# Patient Record
Sex: MALE | Race: WHITE | NOT HISPANIC OR LATINO | Employment: FULL TIME | URBAN - METROPOLITAN AREA
[De-identification: names, ages, dates, MRNs, and addresses within clinical notes are randomized per-mention and may not be internally consistent; named-entity substitution may affect disease eponyms.]

---

## 2018-09-21 ENCOUNTER — OFFICE VISIT (OUTPATIENT)
Dept: FAMILY MEDICINE | Facility: CLINIC | Age: 33
End: 2018-09-21
Payer: COMMERCIAL

## 2018-09-21 ENCOUNTER — HOSPITAL ENCOUNTER (OUTPATIENT)
Dept: RADIOLOGY | Facility: HOSPITAL | Age: 33
Discharge: HOME | End: 2018-09-21
Attending: FAMILY MEDICINE
Payer: COMMERCIAL

## 2018-09-21 ENCOUNTER — TELEPHONE (OUTPATIENT)
Dept: FAMILY MEDICINE | Facility: CLINIC | Age: 33
End: 2018-09-21

## 2018-09-21 VITALS
SYSTOLIC BLOOD PRESSURE: 136 MMHG | DIASTOLIC BLOOD PRESSURE: 70 MMHG | OXYGEN SATURATION: 97 % | HEART RATE: 97 BPM | HEIGHT: 72 IN | TEMPERATURE: 98 F | BODY MASS INDEX: 30.46 KG/M2 | WEIGHT: 224.88 LBS

## 2018-09-21 DIAGNOSIS — M25.561 PAIN OF RIGHT KNEE AFTER INJURY: Primary | ICD-10-CM

## 2018-09-21 DIAGNOSIS — M25.561 PAIN OF RIGHT KNEE AFTER INJURY: ICD-10-CM

## 2018-09-21 PROCEDURE — 99999 PR PBB SHADOW E&M-NEW PATIENT-LVL IV: CPT | Mod: PBBFAC,,, | Performed by: FAMILY MEDICINE

## 2018-09-21 PROCEDURE — 99214 OFFICE O/P EST MOD 30 MIN: CPT | Mod: S$GLB,,, | Performed by: FAMILY MEDICINE

## 2018-09-21 PROCEDURE — 73562 X-RAY EXAM OF KNEE 3: CPT | Mod: TC,FY,PO,RT

## 2018-09-21 PROCEDURE — 3008F BODY MASS INDEX DOCD: CPT | Mod: CPTII,S$GLB,, | Performed by: FAMILY MEDICINE

## 2018-09-21 PROCEDURE — 73562 X-RAY EXAM OF KNEE 3: CPT | Mod: 26,RT,, | Performed by: RADIOLOGY

## 2018-09-21 RX ORDER — IBUPROFEN 800 MG/1
800 TABLET ORAL 3 TIMES DAILY
Qty: 60 TABLET | Refills: 1 | Status: SHIPPED | OUTPATIENT
Start: 2018-09-21

## 2018-09-21 NOTE — PATIENT INSTRUCTIONS
Knee Pain of Uncertain Cause    There are several common causes for knee pain. These can include:  · A sprain of the ligaments that support the joint  · An injury to the cartilage lining of the joint  · Arthritis from wear-and-tear or inflammation  There are other causes as well. There may also be swelling, reduced movement of the knee joint, and pain with walking. A definite diagnosis will still need to be made. If your symptoms do not improve, further follow-up and testing may be needed.  Home care  · Stay off the injured leg as much as possible until pain improves.  · Apply an ice pack over the injured area for 15 to 20 minutes every 3 to 6 hours. You should do this for the first 24 to 48 hours. You can make an ice pack by filling a plastic bag that seals at the top with ice cubes and then wrapping it with a thin towel. Continue to use ice packs for relief of pain and swelling as needed. As the ice melts, be careful to avoid getting your wrap, splint, or cast wet. After 48 hours, apply heat (warm shower or warm bath) for 15 to 20 minutes several times a day, or alternate ice and heat. If you have to wear a hook-and-loop knee brace, you can open it to apply the ice pack, or heat, directly to the knee. Never put ice directly on the skin. Always wrap the ice in a towel or other type of cloth.  · You may use over-the-counter pain medicine to control pain, unless another pain medicine was prescribed. If you have chronic liver or kidney disease or ever had a stomach ulcer or GI bleeding, talk with your healthcare provider using these medicines.  · If crutches or a walker have been recommended, do not put weight on the injured leg until you can do so without pain. Check with your healthcare provider before returning to sports or full work duties.  · If you have a hook-and-loop knee brace, you can remove it to bathe and sleep, unless told otherwise.  Follow-up care  Follow up with your healthcare provider as advised.  This is usually within 1-2 weeks.  If X-rays were taken, you will be told of any new findings that may affect your care.  Call 911  Call 911 if you have:  · Shortness of breath  · Chest pain  When to seek medical advice  Call your healthcare provider right away if any of these occur:  · Toes or foot becomes swollen, cold, blue, numb, or tingly  · Pain or swelling spreads over the knee or calf  · Warmth or redness appears over the knee or calf  · Other joints become painful  · Rash appears  · Fever of 100.4°F (38°C) or above lasting for 24 to 48 hours  Date Last Reviewed: 11/23/2015  © 4195-2883 [employer] [address] [place] [address], [place] [address]. All rights reserved. This information is not intended as a substitute for professional medical care. Always follow your healthcare professional's instructions.

## 2018-09-23 NOTE — PROGRESS NOTES
Routine Office Visit    Patient Name: Brendan Arrington    : 1985  MRN: [id number]    Subjective:  Brendan is a 33 y.o. male who presents today for     1. Right knee pain - started yesterday evening - patient states he was playing basketball and planted his leg and felt a pull when he tried to move. He allowed himself to fall because he did not want to further injure his knee. . He felt and heard a stretching sensation in his outer knee.  He was able to bear weight yesterday and walk to his car and then home. He has been icing and elevating his knee. He feels his knee pain is worse than the injury he had 3 years ago. He would like an evaluation by sports medicine. He states he has been working out regularly and has been doing strength training.      Review of Systems   Constitutional: Negative for chills and fever.   HENT: Negative for congestion.    Eyes: Negative for blurred vision.   Respiratory: Negative for cough.    Cardiovascular: Negative for chest pain.   Gastrointestinal: Negative for abdominal pain, constipation, diarrhea, heartburn, nausea and vomiting.   Genitourinary: Negative for dysuria.   Musculoskeletal: Positive for joint pain. Negative for myalgias.   Skin: Negative for itching and rash.   Neurological: Negative for dizziness and headaches.   Psychiatric/Behavioral: Negative for depression.       Active Problem List  There is no problem list on file for this patient.      Past Surgical History  Past Surgical History:   Procedure Laterality Date    NO PAST SURGERIES         Family History  History reviewed. No pertinent family history.    Social History  Social History     Socioeconomic History    Marital status: Single     Spouse name: Not on file    Number of children: Not on file    Years of education: Not on file    Highest education level: Not on file   Social Needs    Financial resource strain: Not on file    Food insecurity - worry: Not on file    Food insecurity - inability:  Not on file    Transportation needs - medical: Not on file    Transportation needs - non-medical: Not on file   Occupational History    Not on file   Tobacco Use    Smoking status: Never Smoker   Substance and Sexual Activity    Alcohol use: Yes     Alcohol/week: 0.0 oz     Comment: socially    Drug use: Not on file    Sexual activity: Not on file   Other Topics Concern    Not on file   Social History Narrative    Not on file       Medications and Allergies  Reviewed and updated.   Current Outpatient Medications   Medication Sig    ibuprofen (ADVIL,MOTRIN) 800 MG tablet Take 1 tablet (800 mg total) by mouth 3 (three) times daily.     No current facility-administered medications for this visit.        Physical Exam  /70 (BP Location: Right arm, Patient Position: Sitting, BP Method: Large (Manual))   Pulse 97   Temp 98 °F (36.7 °C) (Oral)   Ht 6' (1.829 m)   Wt 102 kg (224 lb 13.9 oz)   SpO2 97%   BMI 30.50 kg/m²   Physical Exam   Constitutional: He is oriented to person, place, and time. He appears well-developed and well-nourished.   HENT:   Head: Normocephalic and atraumatic.   Eyes: Conjunctivae and EOM are normal. Pupils are equal, round, and reactive to light.   Neck: Normal range of motion. Neck supple. No JVD present. No thyromegaly present.   Cardiovascular: Normal rate, regular rhythm and normal heart sounds.   Pulmonary/Chest: Effort normal and breath sounds normal. He has no wheezes.   Abdominal: Soft. Bowel sounds are normal. He exhibits no distension. There is no tenderness. There is no guarding.   Musculoskeletal:        Right knee: He exhibits decreased range of motion and swelling. He exhibits no ecchymosis, no deformity, no laceration, normal alignment, no bony tenderness, normal meniscus and no MCL laxity. Tenderness found. Lateral joint line tenderness noted.   Lymphadenopathy:     He has no cervical adenopathy.   Neurological: He is alert and oriented to person, place, and  time.   Skin: Skin is warm and dry.   Psychiatric: He has a normal mood and affect. His behavior is normal.         Assessment/Plan:  Brendan Arrington is a 33 y.o. male who presents today for :    Problem List Items Addressed This Visit     None      Visit Diagnoses     Pain of right knee after injury    -  Primary    Relevant Medications    ibuprofen (ADVIL,MOTRIN) 800 MG tablet    Other Relevant Orders    X-Ray Knee 3 View Right (Completed)    Ambulatory Referral to Sports Medicine  Continue conservative treatment  RICE   Recommend stretching exercises               Follow-up if symptoms worsen or fail to improve.

## 2018-09-24 ENCOUNTER — PATIENT MESSAGE (OUTPATIENT)
Dept: FAMILY MEDICINE | Facility: CLINIC | Age: 33
End: 2018-09-24

## 2018-09-26 ENCOUNTER — TELEPHONE (OUTPATIENT)
Dept: FAMILY MEDICINE | Facility: CLINIC | Age: 33
End: 2018-09-26

## 2018-09-28 ENCOUNTER — OFFICE VISIT (OUTPATIENT)
Dept: SPORTS MEDICINE | Facility: CLINIC | Age: 33
End: 2018-09-28
Payer: COMMERCIAL

## 2018-09-28 VITALS
HEIGHT: 72 IN | BODY MASS INDEX: 30.34 KG/M2 | HEART RATE: 99 BPM | WEIGHT: 224 LBS | SYSTOLIC BLOOD PRESSURE: 144 MMHG | DIASTOLIC BLOOD PRESSURE: 83 MMHG

## 2018-09-28 DIAGNOSIS — M25.561 RIGHT KNEE PAIN, UNSPECIFIED CHRONICITY: Primary | ICD-10-CM

## 2018-09-28 DIAGNOSIS — M25.361 KNEE INSTABILITY, RIGHT: ICD-10-CM

## 2018-09-28 DIAGNOSIS — M25.461 KNEE EFFUSION, RIGHT: ICD-10-CM

## 2018-09-28 PROCEDURE — 99999 PR PBB SHADOW E&M-EST. PATIENT-LVL III: CPT | Mod: PBBFAC,,, | Performed by: ORTHOPAEDIC SURGERY

## 2018-09-28 PROCEDURE — 3008F BODY MASS INDEX DOCD: CPT | Mod: CPTII,S$GLB,, | Performed by: ORTHOPAEDIC SURGERY

## 2018-09-28 PROCEDURE — 99204 OFFICE O/P NEW MOD 45 MIN: CPT | Mod: S$GLB,,, | Performed by: ORTHOPAEDIC SURGERY

## 2018-09-28 NOTE — PROGRESS NOTES
"CC: Right knee pain    33 y.o. Male presents today for evaluation of his right knee pain. Patient is a  at [employer] on the [employer]. He does a lot of walking as part of his job duties. Patient took off 9/21 and 9/24 from work due to pain. Patient has PMH (3 yrs ago) of R knee injury where he did a Euro step and when leaping toward the hoop his knee "gave out on him". He did not seek any further treatment as he did some rehabing on his own and it got better.     On 9/20/18, he planted his foot and went to cross over while playing basketball. He felt the pain and tearing sensation when pushing off of that foot. He could not put any weight on his leg immediately after the injury. After this injury, he went to his PCP who advised ibuprofen 800mg and icing, which he has been doing and he states have helped the swelling and his pain. He describes the injury as  "buckling and tearing sensation in knee while cutting". Pain is present on all different aspects of the knee. Patient could not pin point one spot. Patient reports the pain has been getting better daily. He has been resting his right leg consistently since this most recent injury. He admits to lots of walking or standing and squatting make his pain worse. Overall, he rates his pain as a 4/10 on the pain scale. He admits to mechanical symptoms of clicking when going from seated to standing and feeling like it's going to buckle on him at times. Patient reports the pain does not stop him from his ADLs, but he does notice the pain.    REVIEW OF SYSTEMS:   Constitution: Patient denies fever, chills, night sweats, and weight changes.  Eyes: Patient denies eye pain or vision changes.  HENT: Patient denies headache, ear pain, sore throat, or nasal discharge.  CVS: Patient denies chest pain.  Lungs: Patient denies shortness of breath or cough.  Abd: Patient denies stomach pain, nausea, or vomiting.  Skin: Patient denies skin rash or itching.  "   Hematologic/Lymphatic: Patient denies easy bruising.   Musculoskeletal: Patient denies recent falls. See HPI.  Psych: Patient denies any current anxiety or nervousness.    PAST MEDICAL HISTORY:   History reviewed. No pertinent past medical history.    PAST SURGICAL HISTORY:   Past Surgical History:   Procedure Laterality Date    NO PAST SURGERIES         FAMILY HISTORY:   History reviewed. No pertinent family history.    SOCIAL HISTORY:   Social History     Socioeconomic History    Marital status: Single     Spouse name: Not on file    Number of children: Not on file    Years of education: Not on file    Highest education level: Not on file   Social Needs    Financial resource strain: Not on file    Food insecurity - worry: Not on file    Food insecurity - inability: Not on file    Transportation needs - medical: Not on file    Transportation needs - non-medical: Not on file   Occupational History    Not on file   Tobacco Use    Smoking status: Never Smoker   Substance and Sexual Activity    Alcohol use: Yes     Alcohol/week: 0.0 oz     Comment: socially    Drug use: Not on file    Sexual activity: Not on file   Other Topics Concern    Not on file   Social History Narrative    Not on file       MEDICATIONS:     Current Outpatient Medications:     ibuprofen (ADVIL,MOTRIN) 800 MG tablet, Take 1 tablet (800 mg total) by mouth 3 (three) times daily., Disp: 60 tablet, Rfl: 1    ALLERGIES:   Review of patient's allergies indicates:  No Known Allergies     PHYSICAL EXAMINATION:  BP (!) 144/83   Pulse 99   Ht 6' (1.829 m)   Wt 101.6 kg (224 lb)   BMI 30.38 kg/m²   Vitals signs and nursing note have been reviewed.  General: In no acute distress, well developed, well nourished, no diaphoresis  Eyes: EOM full and smooth, no eye redness or discharge  HENT: normocephalic and atraumatic, neck supple, trachea midline, no nasal discharge, no external ear redness or discharge  Cardiovascular: no LE  edema  Lungs: respirations non-labored, no conversational dyspnea   Abd: non-distended, no rigidity  MSK: no amputation or deformity, no swelling of extremities  Neuro: AAOx3, CN2-12 grossly intact  Skin: No rashes, warm and dry  Psychiatric: cooperative, pleasant, mood and affect appropriate for age    MUSCULOSKELETAL EXAM:    RIGHT KNEE EXAMINATION   Affected side is compared to contralateral knee     Observation:  No edema, erythema, ecchymosis, noted. Mild effusion of medial joint.  No muscle atrophy of the thighs and calves noted.  No obvious bony deformities noted.     Tenderness:  Patella - none    Lateral joint line - none  Quad tendon - none   Medial joint line - mild  Patellar tendon - none   Medial plica - none  Tibial tubercle - none   Lateral plica - none  Pes anserine - none   MCL prox - none  Distal ITB - none   MCL distal - none  MFC - none    LCL prox - none  LFC - none    LCL distal - none  Tibia - none    Fibula - none    No obvious bursae, plicae, popliteal cysts, or tendon derangement palpated.          ROM:   Active extension to 15° on left without hyperextension, lag, crepitus, or patellar J sign.   Active extension to 0° on right without hyperextension, lag, crepitus, or patellar J sign.   Active flexion to 110° on left and 135° on right.    Strength: (bilaterally)  Knee Flexion - 5/5 on left and 5/5 on right  Knee Extension - 5/5 on left and 5/5 on right  Hip Flexion - 5/5 on left and 5/5 on right  Hip Extension - 5/5 on left and 5/5 on right    Patellofemoral Exam:  Patellar ballottement - negative  Bulge sign - negative  Patellar grind - negative  No patellar laxity with medial and lateral translation   No apprehension with medial and lateral patellar translation.     Meniscus Testing:     No pain with terminal extension and flexion. Unable to fully extend knee while supine. Forced flexion causes discomfort.  Sheebas test - positive for click on medial aspect.  Thesaly test -  negative  Bounce home test - unable to do as full extension was not possible    Ligament Testing:  Lachman's test - negative  No laxity with anterior drawer.  No laxity with posterior drawer.    No laxity with varus testing at 0 and 30 degrees.  No laxity with valgus testing at 0 and 30 degrees.    Neurovascular Examination:   Normal gait without antalgia.  Sensation intact to light touch in the obturator, lateral/intermediate/medial/posterior femoral cutaneous, saphenous, and common peroneal nerves bilaterally.  Pulses intact at the DP and PT arteries bilaterally.    Capillary refill intact <2 seconds in all toes bilaterally.      IMAGIN. X-rays obtained in PCP's office on 18 due to right knee pain  2. X-ray images were reviewed personally by me and then directly with patient.  3. FINDINGS: X-ray images obtained demonstrate no cortical irregularities, sclerosis, osteophyte formation, or subchonral cysts. There is no joint space narrowing.  4. IMPRESSION: No pathology or irregularities appreciated.       ASSESSMENT:      ICD-10-CM ICD-9-CM   1. Right knee pain, unspecified chronicity M25.561 719.46   2. Knee instability, right M25.361 718.86   3. Knee effusion, right M25.461 719.06         PLAN:  1-3. Right knee - not improving  - XRs taken this week were reviewed in the office today and images were personally reviewed with the patient. See above.  - Due to mechanical symptoms, effusion, exam findings, and feelings of instability we discussed meniscal pathology as a concern. We will proceed with an MRI to assess for internal derangement.  - Advised continued use of icing, ibuprofen, wrapping/bracing with OTC sleeve as needed.  - He is going to refrain from lower body workouts for the time being until we have a better assessment of his injury. OK with upper body lifting.  - Based on MRI findings, we will know the next step.    Future planning includes - MRI at this time. Possibly scope vs PT.    All  questions were answered to the best of my ability and all concerns were addressed at this time.    Follow up in after MRI, or sooner if needed.

## 2018-09-28 NOTE — LETTER
September 29, 2018      Luisa Lewis MD  [address]  [place] [address]           [address] [address]  Phone: [phone]          Patient: Brendan Arrington   MR Number: [id number]   YOB: 1985   Date of Visit: 9/28/2018       Dear Dr. Luisa Lewis:    Thank you for referring Brendan Arrington to me for evaluation. Attached you will find relevant portions of my assessment and plan of care.    If you have questions, please do not hesitate to call me. I look forward to following Brendan Arrington along with you.    Sincerely,    Howard Fajardo, DO    Enclosure  CC:  No Recipients    If you would like to receive this communication electronically, please contact [email or web address] or [phone] to request more information on [employer] access.    For providers and/or their staff who would like to refer a patient to [facility], please contact us through our one-stop-shop provider referral line, [facility], at [phone].    If you feel you have received this communication in error or would no longer like to receive these types of communications, please e-mail [email or web address]

## 2018-10-01 ENCOUNTER — TELEPHONE (OUTPATIENT)
Dept: SPORTS MEDICINE | Facility: CLINIC | Age: 33
End: 2018-10-01

## 2018-10-01 DIAGNOSIS — G89.29 CHRONIC PAIN OF RIGHT KNEE: Primary | ICD-10-CM

## 2018-10-01 DIAGNOSIS — M25.561 CHRONIC PAIN OF RIGHT KNEE: Primary | ICD-10-CM

## 2018-10-11 ENCOUNTER — PATIENT MESSAGE (OUTPATIENT)
Dept: FAMILY MEDICINE | Facility: CLINIC | Age: 33
End: 2018-10-11

## 2018-10-11 ENCOUNTER — TELEPHONE (OUTPATIENT)
Dept: ORTHOPEDICS | Facility: CLINIC | Age: 33
End: 2018-10-11

## 2018-10-11 NOTE — TELEPHONE ENCOUNTER
----- Message from Howard Fajardo DO sent at 10/11/2018  2:26 PM CDT -----  Contact: self      ----- Message -----  From: Mena Alvarez  Sent: 10/11/2018   2:20 PM  To: Howard Fajardo DO    Pt called requesting his MRI order for his right knee to be faxed over to [facility] at [phone]. Pt could be reached at [phone]

## 2018-10-18 ENCOUNTER — OFFICE VISIT (OUTPATIENT)
Dept: ORTHOPEDICS | Facility: CLINIC | Age: 33
End: 2018-10-18
Payer: COMMERCIAL

## 2018-10-18 ENCOUNTER — PATIENT MESSAGE (OUTPATIENT)
Dept: ORTHOPEDICS | Facility: CLINIC | Age: 33
End: 2018-10-18

## 2018-10-18 DIAGNOSIS — S83.241A TEAR OF MEDIAL MENISCUS OF RIGHT KNEE, CURRENT, UNSPECIFIED TEAR TYPE, INITIAL ENCOUNTER: ICD-10-CM

## 2018-10-18 DIAGNOSIS — M25.361 KNEE INSTABILITY, RIGHT: ICD-10-CM

## 2018-10-18 DIAGNOSIS — S83.511A COMPLETE TEAR OF ANTERIOR CRUCIATE LIGAMENT OF RIGHT KNEE, INITIAL ENCOUNTER: Primary | ICD-10-CM

## 2018-10-18 PROCEDURE — 99214 OFFICE O/P EST MOD 30 MIN: CPT | Mod: S$GLB,,, | Performed by: ORTHOPAEDIC SURGERY

## 2018-10-18 PROCEDURE — 99999 PR PBB SHADOW E&M-EST. PATIENT-LVL I: CPT | Mod: PBBFAC,,, | Performed by: ORTHOPAEDIC SURGERY

## 2018-10-18 NOTE — PROGRESS NOTES
CC: right knee pain    33 y.o. Male presents today for follow up evaluation of his right knee pain. He had his MRI done this past week and has the disc and report to review. He states that he has noticed more pain on the medial aspect of his knee and believes it's because he has been walking more at work. He has been avoiding leg exercises due to concern about feeling unstable. He denies any new injuries since his last visit. He denies any swelling that he can tell.     REVIEW OF SYSTEMS:   Constitution: Patient denies fever or chills.  Eyes: Patient denies eye pain or vision changes.  CVS: Patient denies chest pain.  Lungs: Patient denies shortness of breath or cough.  Skin: Patient denies skin rash or itching.    Musculoskeletal: Patient denies recent falls. See HPI.  Psych: Patient denies any current anxiety or nervousness.    PAST MEDICAL HISTORY:   History reviewed. No pertinent past medical history.    MEDICATIONS:     Current Outpatient Medications:     ibuprofen (ADVIL,MOTRIN) 800 MG tablet, Take 1 tablet (800 mg total) by mouth 3 (three) times daily., Disp: 60 tablet, Rfl: 1    ALLERGIES:   Review of patient's allergies indicates:  No Known Allergies     PHYSICAL EXAMINATION:    General: In no acute distress, well developed, well nourished, no diaphoresis  Eyes: EOM full and smooth, no eye redness or discharge  HENT: normocephalic and atraumatic, neck supple, trachea midline, no nasal discharge  Cardiovascular: no LE edema  Lungs: respirations non-labored, no conversational dyspnea   Neuro: AAOx3, CN2-12 grossly intact  Skin: No rashes, warm and dry  Psychiatric: cooperative, pleasant, mood and affect appropriate for age    MUSCULOSKELETAL EXAM:    RIGHT KNEE EXAMINATION   Affected side is compared to contralateral knee     Observation:  No edema, erythema, ecchymosis, or effusion noted.  No muscle atrophy of the thighs and calves noted.  No obvious bony deformities noted.     Tenderness:  Patella -  none    Lateral joint line - none  Quad tendon - none   Medial joint line - mild  Patellar tendon - none   Medial plica - none  Tibial tubercle - none   Lateral plica - none  Pes anserine - none   MCL prox - none  Distal ITB - none   MCL distal - none  MFC - none    LCL prox - none  LFC - none    LCL distal - none  Tibia - none    Fibula - none    No obvious bursae, plicae, popliteal cysts, or tendon derangement palpated.          ROM:   Active extension to 0° bilaterally without hyperextension, lag, crepitus, or patellar J sign.    Active flexion to 135° bilaterally.    Strength: (bilaterally)  Knee Flexion - 5/5  Knee Extension - 5/5    Meniscus Testing:     No pain with terminal extension and flexion.  Sheebas test - positive for click along medial joint line    Ligament Testing:  Lachman's test - slight laxity compared to left  Laxity with anterior drawer compared to left  No laxity with posterior drawer.    No laxity with varus testing at 0 and 30 degrees.  No laxity with valgus testing at 0 and 30 degrees.    Neurovascular Examination:   Normal gait without antalgia.  Sensation intact to light touch in the obturator, lateral/intermediate/medial/posterior femoral cutaneous, saphenous, and common peroneal nerves bilaterally.   Pulses intact at the DP arteries bilaterally.    Capillary refill intact <2 seconds in all toes bilaterally.    ASSESSMENT:      ICD-10-CM ICD-9-CM   1. Complete tear of anterior cruciate ligament of right knee, initial encounter S83.511A 844.2   2. Tear of medial meniscus of right knee, current, unspecified tear type, initial encounter S83.241A 836.0   3. Knee instability, right M25.361 718.86       PLAN:  1-3. Knee -   - MRI images and report personally reviewed by me and with Morgan scott in the office. Understanding expressed regarding his diagnosis of an ACL tear in addition to the meniscus.  - Will get him set up with Dr. Faustin for surgical evaluation and consultation. He will  see him next week and was advised to bring the disc and report.  - Discussed exercises and activities to avoid, including explosive workouts, such as box jumps. He states that he will be taking it easy on his knee.  - OK to continue with ibuprofen PRN for discomfort.     Future planning includes - surgical consult for ACL tear, medial meniscus tear    All questions were answered to the best of my ability and all concerns were addressed at this time.    Follow up with me as needed. Care to be taken by orthopedic surgeon at this time.

## 2018-10-23 ENCOUNTER — OFFICE VISIT (OUTPATIENT)
Dept: SPORTS MEDICINE | Facility: CLINIC | Age: 33
End: 2018-10-23
Payer: COMMERCIAL

## 2018-10-23 VITALS
HEART RATE: 90 BPM | WEIGHT: 224 LBS | DIASTOLIC BLOOD PRESSURE: 72 MMHG | SYSTOLIC BLOOD PRESSURE: 134 MMHG | HEIGHT: 72 IN | BODY MASS INDEX: 30.34 KG/M2

## 2018-10-23 DIAGNOSIS — S83.519A ACL TEAR: ICD-10-CM

## 2018-10-23 DIAGNOSIS — M25.561 CHRONIC PAIN OF RIGHT KNEE: Primary | ICD-10-CM

## 2018-10-23 DIAGNOSIS — G89.29 CHRONIC PAIN OF RIGHT KNEE: Primary | ICD-10-CM

## 2018-10-23 DIAGNOSIS — S83.249A MEDIAL MENISCUS TEAR: ICD-10-CM

## 2018-10-23 PROCEDURE — 3008F BODY MASS INDEX DOCD: CPT | Mod: CPTII,S$GLB,, | Performed by: ORTHOPAEDIC SURGERY

## 2018-10-23 PROCEDURE — 99999 PR PBB SHADOW E&M-EST. PATIENT-LVL III: CPT | Mod: PBBFAC,,, | Performed by: ORTHOPAEDIC SURGERY

## 2018-10-23 PROCEDURE — 99214 OFFICE O/P EST MOD 30 MIN: CPT | Mod: S$GLB,,, | Performed by: ORTHOPAEDIC SURGERY

## 2018-10-24 NOTE — PROGRESS NOTES
CC: Right knee pain    33 y.o. Male with a history of right knee pain who on 9/20/18, he was playing basketball when he planted his foot and twisted.  Reports a tearing sensation and tearing sensation.  Reports continued instability since initial injury.    He reports that the pain and weakness. It also bothers him at night.    + mechanical symptoms, + instability    Is affecting ADLs.  Pain is 1/10 at it's worst.    REVIEW OF SYSTEMS:   Constitution: Negative. Negative for chills, fever and night sweats.   HENT: Negative for congestion and headaches.    Eyes: Negative for blurred vision, left vision loss and right vision loss.   Cardiovascular: Negative for chest pain and syncope.   Respiratory: Negative for cough and shortness of breath.    Endocrine: Negative for polydipsia, polyphagia and polyuria.   Hematologic/Lymphatic: Negative for bleeding problem. Does not bruise/bleed easily.   Skin: Negative for dry skin, itching and rash.   Musculoskeletal: Negative for falls. Positive for right knee pain and  muscle weakness.   Gastrointestinal: Negative for abdominal pain and bowel incontinence.   Genitourinary: Negative for bladder incontinence and nocturia.   Neurological: Negative for disturbances in coordination, loss of balance and seizures.   Psychiatric/Behavioral: Negative for depression. The patient does not have insomnia.    Allergic/Immunologic: Negative for hives and persistent infections.     PAST MEDICAL HISTORY:   History reviewed. No pertinent past medical history.    PAST SURGICAL HISTORY:   Past Surgical History:   Procedure Laterality Date    NO PAST SURGERIES         FAMILY HISTORY:   History reviewed. No pertinent family history.    SOCIAL HISTORY:   Social History     Socioeconomic History    Marital status: Single     Spouse name: Not on file    Number of children: Not on file    Years of education: Not on file    Highest education level: Not on file   Social Needs    Financial resource  strain: Not on file    Food insecurity - worry: Not on file    Food insecurity - inability: Not on file    Transportation needs - medical: Not on file    Transportation needs - non-medical: Not on file   Occupational History    Not on file   Tobacco Use    Smoking status: Never Smoker   Substance and Sexual Activity    Alcohol use: Yes     Alcohol/week: 0.0 oz     Comment: socially    Drug use: Not on file    Sexual activity: Not on file   Other Topics Concern    Not on file   Social History Narrative    Not on file       MEDICATIONS:     Current Outpatient Medications:     ibuprofen (ADVIL,MOTRIN) 800 MG tablet, Take 1 tablet (800 mg total) by mouth 3 (three) times daily., Disp: 60 tablet, Rfl: 1    ALLERGIES:   Review of patient's allergies indicates:  No Known Allergies    VITAL SIGNS:   /72   Pulse 90   Ht 6' (1.829 m)   Wt 101.6 kg (224 lb)   BMI 30.38 kg/m²      PHYSICAL EXAMINATION:  General:  The patient is alert and oriented x 3.  Mood is pleasant.  Observation of ears, eyes and nose reveal no gross abnormalities.  No labored breathing observed.    RIGHT KNEE EXAMINATION     OBSERVATION / INSPECTION   Gait:   Nonantalgic   Alignment:  Neutral   Scars:   None   Muscle atrophy: Mild  Effusion:  None   Warmth:  None   Discoloration:   none     TENDERNESS / CREPITUS (T / C):          T / C      T / C   Patella   - / -   Lateral joint line   - / -   Peripatellar medial  -  Medial joint line    + / -   Peripatellar lateral -  Medial plica   - / -   Patellar tendon -   Popliteal fossa   - / -   Quad tendon   -   Gastrocnemius   -   Prepatellar Bursa - / -   Quadricep   -   Tibial tubercle  -  Thigh/hamstring  -   Pes anserine/HS -  Fibula    -   ITB   - / -  Tibia     -   Tib/fib joint  - / -  LCL    -     MFC   - / -   MCL: Proximal  -    LFC   - / -    Distal   -          ROM: (* = pain)  PASSIVE   ACTIVE    Left :   5 / 0 / 145   5 / 0 / 145     Right :    5 / 0 / 145   5 / 0 /  145    Patellofemoral examination:  See above noted areas of tenderness.   Patella position    Subluxation / dislocation: Centered           Sup. / Inf;   Normal   Crepitus (PF):    Absent   Patellar Mobility:       Medial-lateral:   Normal    Superior-inferior:  Normal    Inferior tilt   Normal    Patellar tendon:  Normal   Lateral tilt:    Normal   J-sign:     None   Patellofemoral grind:   No pain       MENISCAL SIGNS:     Pain on terminal extension:  -  Pain on terminal flexion:  +  Sheebas maneuver:  + (for pain)  Squat     + (for pain)    LIGAMENT EXAMINATION:  ACL / Lachman:  ABNORMAL  PCL-Post.  drawer: normal 0 to 2mm  MCL- Valgus:  normal 0 to 2mm  LCL- Varus:  normal 0 to 2mm  Pivot shift: normal (Equal)   Dial Test: difference c/w other side   At 30° flexion: normal (< 5°)    At 90° flexion: normal (< 5°)   Reverse Pivot Shift:   normal (Equal)     STRENGTH: (* = with pain) PAINFUL SIDE   Quadricep   5/5   Hamstrin/5    EXTREMITY NEURO-VASCULAR EXAMINATION:   Sensation:  Grossly intact to light touch all dermatomal regions.   Motor Function:  Fully intact motor function at hip, knee, foot and ankle    DTRs;  quadriceps and  achilles 2+.  No clonus and downgoing Babinski.    Vascular status:  DP and PT pulses 2+, brisk capillary refill, symmetric.     XRAY (18): The joint spaces are well maintained.  No fracture.  No marrow replacement process.  The alignment is within normal limits.    MRI External DIS (10/11/18):  1. Complete full-thickness midsubstance ACL tear.  2. Contiguous bone marrow contusions and edema in the lateral femoral condyle and posteromedial and posterolateral tibial plateau in a pattern and distribution consistent with ACL tear.  3. Large knee joint effusion.  4. Chondromalacia patella with full-thickness cartilage defect in the patellar apical cartilage and the medial patellar facet cartilage with subchondral bone marrow edema and cystic change.  5. Complex  multidirectional tear of the posterior horn and body of the medial meniscus with longitudinal oblique and vertical components.    ASSESSMENT:    Right knee ACL tear and medial meniscus tear    PLAN:   Treatment options were discussed with the patient about his knee.  I reviewed the MRI images and relevant anatomy with his and what this means for his knee.    We discussed both non-operative and operative options for his knee and the risks and benefits of each.    I feel like he would benefit from ACL reconstruction for his knee.  After a discussion of specific risks and benefits, he would like to proceed with setting this up.      These risks include: patella fracture, patella tendonitis, instability or re-tear, bleeding, infection, scarring, damage to neurovascular structures, damage to cartilage, stiffness, blood clots, pulmonary embolism, swelling, compartment syndrome, need for further surgery, and the risks of anesthesia.      He verbalized his understanding of these risks and wished to proceed with surgery.    We discussed the common graft options including bone-patella tendon-bone and hamstring autografts, as well as the risks and benefits of allografts.    He verbalized his understanding of the different graft options and would like to use BTB autograft for his ACL reconstruction.    A total of 25 minutes were spent face-to-face with the patient during this encounter and over half of that time was spent on counseling about treatment options including his choice for surgery, graft choice and coordination of his care for his preoperative visits, surgery and post-operative rehab.  We also had a detailed discussion of his expectation level for this procedure as well as any limitations at home or work and with exercising following surgery.     The operative plan is:    right   1. ACL reconstruction with ipsilateral bone-patella tendon-bone autograft  2. Possible arthroscopic meniscus repair  3. Possible  arthroscopic meniscectomy  4. Possible arthroscopic cartilage repair  5. Possible arthroscopic debridement  6. Possible arthroscopic microfracture     Patient will not  need medical clearance prior to the pre-operative appointment.    If he wishes to proceed, we'll get his scheduled for this at his convenience around his work schedule.        All questions were answered, pt will contact us for questions or concerns in the interim.

## 2018-11-15 RX ORDER — IBUPROFEN 800 MG/1
TABLET ORAL
Qty: 60 TABLET | Refills: 1 | Status: SHIPPED | OUTPATIENT
Start: 2018-11-15

## 2020-08-14 DIAGNOSIS — Z11.59 NEED FOR HEPATITIS C SCREENING TEST: ICD-10-CM

## 2021-04-15 ENCOUNTER — PATIENT MESSAGE (OUTPATIENT)
Dept: RESEARCH | Facility: HOSPITAL | Age: 36
End: 2021-04-15